# Patient Record
Sex: FEMALE | Race: WHITE
[De-identification: names, ages, dates, MRNs, and addresses within clinical notes are randomized per-mention and may not be internally consistent; named-entity substitution may affect disease eponyms.]

---

## 2021-10-12 ENCOUNTER — NURSE TRIAGE (OUTPATIENT)
Dept: OTHER | Facility: CLINIC | Age: 66
End: 2021-10-12

## 2021-10-12 NOTE — TELEPHONE ENCOUNTER
No triage, information only. Reason for Disposition   Information only question and nurse able to answer    Answer Assessment - Initial Assessment Questions  1. REASON FOR CALL or QUESTION: \"What is your reason for calling today? \" or \"How can I best help you? \" or \"What question do you have that I can help answer? \"      My sister has Covid and I have been checking on her daily. Want's to know what to watch for while she is sick. Caller gave her FAQ's and answers. This seemed to help and se was satisfied.     Protocols used: INFORMATION ONLY CALL - NO TRIAGE-ADULT-[place]

## 2024-05-27 NOTE — PROGRESS NOTES
[facility] [place] [facility]  [facility] NEUROSURGERY  [address] [address]  [phone]       Patient Name:  Alicia Lezama  Visit Date:  5/28/2024    HPI:     Chief Complaint   Patient presents with    New Patient     Cervical Spondylosis        HPI   Ms. Lezama is a 69 y.o. female that presents today at [facility] with a referral from Dr. Brizuela to evaluate Cervical spondylosis.  PMHX: cervical dystonia, arthritis, hypothyroidism.  Past surgical history significant for a ACDF C3-4 in 2013 with Dr. Negro.     Patient arrives today with her  and ambulating independently.  She notes a prior cervical surgery in 2013 with Dr. Alexandra charles after which time her pain initially improved.  She notes that her neck pain has been worsening over the last couple years.  She notes that her neck pain is up to 6/10 at its worst.  The neck pain radiates to bilateral shoulders and down her left arm extending to her elbow.  Endorses numbness/tingling of her left arm and hand. Pain seems to be worse with neck flexion, minimal improved with heat or holding neck in neutral position.  She has tried physical therapy in the past with no improvement.  She does treat with pain management, Bernie Hsieh CNP.  She has received some type of injection therapy (possibly related to cervical dystonia?) which does provide some short-term improvement in her neck pain.    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  She denies weakness in her upper or lower extremities, gait difficulty/imbalance, urinary incontinence or retention.        Cervical MRI 3/22/24   C3-4 anterior fusion without complication, unchanged compared to prior study. Progressive degenerative disc disease throughout rest of the spine compared to prior study.  Most pronounced at C2-3, C6-7 where there is mild cord impingement.  The

## 2024-05-28 ENCOUNTER — OFFICE VISIT (OUTPATIENT)
Dept: NEUROSURGERY | Age: 69
End: 2024-05-28
Payer: COMMERCIAL

## 2024-05-28 ENCOUNTER — HOSPITAL ENCOUNTER (OUTPATIENT)
Dept: MRI IMAGING | Age: 69
Discharge: HOME | End: 2024-05-28
Attending: RADIOLOGY

## 2024-05-28 VITALS
SYSTOLIC BLOOD PRESSURE: 158 MMHG | DIASTOLIC BLOOD PRESSURE: 84 MMHG | HEIGHT: 64 IN | HEART RATE: 83 BPM | BODY MASS INDEX: 20.14 KG/M2 | WEIGHT: 118 LBS

## 2024-05-28 DIAGNOSIS — M48.02 CERVICAL SPINAL STENOSIS DUE TO ADJACENT SEGMENT DISEASE AFTER FUSION PROCEDURE: Primary | ICD-10-CM

## 2024-05-28 DIAGNOSIS — M50.30 DDD (DEGENERATIVE DISC DISEASE), CERVICAL: ICD-10-CM

## 2024-05-28 DIAGNOSIS — M50.30 CERVICAL SPINAL STENOSIS DUE TO ADJACENT SEGMENT DISEASE AFTER FUSION PROCEDURE: Primary | ICD-10-CM

## 2024-05-28 DIAGNOSIS — Z98.1 HISTORY OF FUSION OF CERVICAL SPINE: ICD-10-CM

## 2024-05-28 DIAGNOSIS — Z00.6 EXAMINATION FOR NORMAL COMPARISON OR CONTROL IN CLINICAL RESEARCH: ICD-10-CM

## 2024-05-28 PROCEDURE — 99204 OFFICE O/P NEW MOD 45 MIN: CPT

## 2024-05-28 PROCEDURE — 1123F ACP DISCUSS/DSCN MKR DOCD: CPT

## 2024-05-28 RX ORDER — TRAMADOL HYDROCHLORIDE 50 MG/1
50 TABLET ORAL DAILY
COMMUNITY

## 2024-05-28 RX ORDER — UBIDECARENONE 30 MG
100 CAPSULE ORAL DAILY
COMMUNITY

## 2024-05-28 RX ORDER — SIMVASTATIN 20 MG
20 TABLET ORAL NIGHTLY
COMMUNITY
Start: 2024-04-01

## 2024-05-28 RX ORDER — GABAPENTIN 100 MG/1
300 CAPSULE ORAL NIGHTLY
COMMUNITY
Start: 2022-06-10

## 2024-05-28 RX ORDER — BUSPIRONE HYDROCHLORIDE 15 MG/1
15 TABLET ORAL 3 TIMES DAILY PRN
COMMUNITY
Start: 2024-03-28

## 2024-05-28 RX ORDER — VENLAFAXINE HYDROCHLORIDE 75 MG/1
75 CAPSULE, EXTENDED RELEASE ORAL DAILY
COMMUNITY
Start: 2024-04-01

## 2024-08-27 ENCOUNTER — OFFICE VISIT (OUTPATIENT)
Dept: NEUROSURGERY | Age: 69
End: 2024-08-27
Payer: MEDICARE

## 2024-08-27 VITALS
BODY MASS INDEX: 21.53 KG/M2 | HEART RATE: 70 BPM | DIASTOLIC BLOOD PRESSURE: 72 MMHG | HEIGHT: 62 IN | WEIGHT: 117 LBS | SYSTOLIC BLOOD PRESSURE: 128 MMHG

## 2024-08-27 DIAGNOSIS — Z98.1 HISTORY OF FUSION OF CERVICAL SPINE: ICD-10-CM

## 2024-08-27 DIAGNOSIS — M48.02 CERVICAL SPINAL STENOSIS DUE TO ADJACENT SEGMENT DISEASE AFTER FUSION PROCEDURE: Primary | ICD-10-CM

## 2024-08-27 DIAGNOSIS — M50.30 CERVICAL SPINAL STENOSIS DUE TO ADJACENT SEGMENT DISEASE AFTER FUSION PROCEDURE: Primary | ICD-10-CM

## 2024-08-27 DIAGNOSIS — M50.30 DDD (DEGENERATIVE DISC DISEASE), CERVICAL: ICD-10-CM

## 2024-08-27 PROCEDURE — 1123F ACP DISCUSS/DSCN MKR DOCD: CPT

## 2024-08-27 PROCEDURE — 99213 OFFICE O/P EST LOW 20 MIN: CPT

## 2024-08-27 RX ORDER — BACLOFEN 10 MG/1
10 TABLET ORAL
COMMUNITY
Start: 2024-08-15

## 2024-08-27 NOTE — PROGRESS NOTES
[facility] [place] [facility]  [facility] NEUROSURGERY  [address] [address]  [phone]       Patient Name:  Alicia Lezama  Visit Date:  8/27/2024    HPI:     Chief Complaint   Patient presents with    3 Month Follow-Up     Follow up. 3 month.         HPI   5/28/24: Ms. Lezama is a 69 y.o. female that presents today at [facility] with a referral from Dr. Brizuela to evaluate Cervical spondylosis.  PMHX: cervical dystonia, arthritis, hypothyroidism.  Past surgical history significant for a ACDF C3-4 in 2013 with Dr. Negro.     Patient arrives today with her  and ambulating independently.  She notes a prior cervical surgery in 2013 with Dr. Negro after which time her pain initially improved.  She notes that her neck pain has been worsening over the last couple years.  She notes that her neck pain is up to 6/10 at its worst.  The neck pain radiates to bilateral shoulders and down her left arm extending to her elbow.  Endorses numbness/tingling of her left arm and hand. Pain seems to be worse with neck flexion, minimal improved with heat or holding neck in neutral position.  She has tried physical therapy in the past with no improvement.  She does treat with pain management, Bernie Hsieh CNP.  She has received some type of injection therapy (possibly related to cervical dystonia?) which does provide some short-term improvement in her neck pain.    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  She denies weakness in her upper or lower extremities, gait difficulty/imbalance, urinary incontinence or retention.        Cervical MRI 3/22/24   C3-4 anterior fusion without complication, unchanged compared to prior study. Progressive degenerative disc disease throughout rest of the spine compared to prior study.  Most pronounced at C2-3, C6-7 where there is mild cord  impingement.  The degree of facet arthropathy and foraminal narrowing has increased throughout the cervical spine as described above.    8/27/24: Patient arrives today ambulating independently and unaccompanied. She notes her neck pain remains stable, but has not been worsening. Will occasionally radiate down her left arm extending to her elbow. Endorses some tingling of bilateral hands intermittently. Reports recent botox injection with Dr. Brizuela (neurology) which has helped her pain.  Denies UE or LE weakness. No imbalance/gait difficulty. Denies bowel/bladder incontinence. Continues to wish to avoid surgical intervention.        Medications:    Current Outpatient Medications:     baclofen (LIORESAL) 10 MG tablet, Take 1 tablet by mouth PRN, Disp: , Rfl:     busPIRone (BUSPAR) 15 MG tablet, Take 15 mg by mouth 3 times daily as needed, Disp: , Rfl:     simvastatin (ZOCOR) 20 MG tablet, Take 1 tablet by mouth nightly, Disp: , Rfl:     traMADol (ULTRAM) 50 MG tablet, Take 1 tablet by mouth daily., Disp: , Rfl:     venlafaxine (EFFEXOR XR) 75 MG extended release capsule, Take 1 capsule by mouth daily, Disp: , Rfl:     gabapentin (NEURONTIN) 100 MG capsule, Take 3 capsules by mouth nightly., Disp: , Rfl:     coenzyme Q-10 100 MG capsule, Take 1 capsule by mouth daily, Disp: , Rfl:     levothyroxine (SYNTHROID) 50 MCG tablet, Take 1 tablet by mouth Daily, Disp: , Rfl:     ALPRAZolam (XANAX) 1 MG tablet, Take 1 mg by mouth every 8 hours as needed. (Patient not taking: Reported on 5/28/2024), Disp: , Rfl:     predniSONE (DELTASONE) 20 MG tablet, Take 20 mg by mouth daily. (Patient not taking: Reported on 5/28/2024), Disp: , Rfl:     The patient has No Known Allergies.    Past Medical History  Alicia  has a past medical history of Arthritis, Nausea & vomiting, and Thyroid disease.    Past Surgical History  The patient  has a past surgical history that includes Hysterectomy; Rotator cuff repair (Left, 2002);